# Patient Record
Sex: MALE | Race: NATIVE HAWAIIAN OR OTHER PACIFIC ISLANDER | HISPANIC OR LATINO | Employment: FULL TIME | URBAN - METROPOLITAN AREA
[De-identification: names, ages, dates, MRNs, and addresses within clinical notes are randomized per-mention and may not be internally consistent; named-entity substitution may affect disease eponyms.]

---

## 2024-09-15 ENCOUNTER — HOSPITAL ENCOUNTER (EMERGENCY)
Facility: HOSPITAL | Age: 34
Discharge: HOME | End: 2024-09-15

## 2024-09-15 ENCOUNTER — APPOINTMENT (OUTPATIENT)
Dept: ULTRASOUND IMAGING | Facility: HOSPITAL | Age: 34
End: 2024-09-15

## 2024-09-15 VITALS
TEMPERATURE: 98.5 F | OXYGEN SATURATION: 95 % | BODY MASS INDEX: 31.1 KG/M2 | HEIGHT: 69 IN | SYSTOLIC BLOOD PRESSURE: 137 MMHG | DIASTOLIC BLOOD PRESSURE: 79 MMHG | WEIGHT: 210 LBS | HEART RATE: 71 BPM | RESPIRATION RATE: 20 BRPM

## 2024-09-15 DIAGNOSIS — M79.601 PAIN OF RIGHT UPPER EXTREMITY: ICD-10-CM

## 2024-09-15 DIAGNOSIS — G56.01 CARPAL TUNNEL SYNDROME OF RIGHT WRIST: ICD-10-CM

## 2024-09-15 PROCEDURE — 93971 EXTREMITY STUDY: CPT | Mod: RT

## 2024-09-15 PROCEDURE — 99284 EMERGENCY DEPT VISIT MOD MDM: CPT | Mod: 25

## 2024-09-15 PROCEDURE — 250N000013 HC RX MED GY IP 250 OP 250 PS 637

## 2024-09-15 RX ORDER — IBUPROFEN 600 MG/1
600 TABLET, FILM COATED ORAL ONCE
Status: COMPLETED | OUTPATIENT
Start: 2024-09-15 | End: 2024-09-15

## 2024-09-15 RX ORDER — ACETAMINOPHEN 325 MG/1
650 TABLET ORAL ONCE
Status: COMPLETED | OUTPATIENT
Start: 2024-09-15 | End: 2024-09-15

## 2024-09-15 RX ADMIN — ACETAMINOPHEN 650 MG: 325 TABLET ORAL at 13:40

## 2024-09-15 RX ADMIN — IBUPROFEN 600 MG: 600 TABLET, FILM COATED ORAL at 13:40

## 2024-09-15 ASSESSMENT — COLUMBIA-SUICIDE SEVERITY RATING SCALE - C-SSRS
2. HAVE YOU ACTUALLY HAD ANY THOUGHTS OF KILLING YOURSELF IN THE PAST MONTH?: NO
1. IN THE PAST MONTH, HAVE YOU WISHED YOU WERE DEAD OR WISHED YOU COULD GO TO SLEEP AND NOT WAKE UP?: NO
6. HAVE YOU EVER DONE ANYTHING, STARTED TO DO ANYTHING, OR PREPARED TO DO ANYTHING TO END YOUR LIFE?: NO

## 2024-09-15 ASSESSMENT — ACTIVITIES OF DAILY LIVING (ADL): ADLS_ACUITY_SCORE: 33

## 2024-09-15 NOTE — ED TRIAGE NOTES
Patient here with right arm pain for one week, reports no recent injury. Pain is primary from elbow to wrist, feeling tension up to neck. States it is difficult to close hand, and describes it feels like tendonitis or carpal tunnel. No history of these. Pain is making it difficult to sleep.

## 2024-09-15 NOTE — DISCHARGE INSTRUCTIONS
You were seen in the emergency department for evaluation of right arm pain.  Thankfully, there is no blood clot in the arm.  It seems that you are having a more muscle pain of the upper arm and forearm.  Use Tylenol and ibuprofen for this pain.  You can also alternate ice and heat to help with the muscle soreness.    You may take 600 mg of ibuprofen (Advil) every 6 hours and 650 mg acetaminophen (Tylenol) every 6 hours for pain. Do not take more than 3200 mg of ibuprofen (Advil) in 24 hours. Do not take more than 3000 mg of acetaminophen (Tylenol) in 24 hours. You may take this much for 1-3 days, then only use as needed.     The tingling in your fingers is most consistent with carpal tunnel.  I provided you with a wrist brace to wear at work and while you are sleeping.    I referred you to primary care.  Please call them tomorrow to set up an appointment to discuss the pain that you have been having.    Return to the emergency department for fever, uncontrollable pain, redness/swelling of the hand/arm, or any other concerning symptoms.

## 2024-09-15 NOTE — ED PROVIDER NOTES
Emergency Department Encounter   NAME: Lasha Crawford  AGE: 34 year old male  YOB: 1990  MRN: [id number]    PCP: No primary care provider on file.  ED PROVIDER: Luna Julio PA-C    Evaluation Date & Time:   No admission date for patient encounter.    CHIEF COMPLAINT:  Arm Pain      Impression and Plan   MDM: 34-year-old male with no pertinent history presents for evaluation of arm pain.  Intermittent right thumb, index, and middle finger tingling.  Worse when he is working with his right hand, when he is trying to go to bed, and right away in the morning.  On arrival here patient is vitally stable.  Afebrile.  On my examination patient is in no acute distress.  He has no bony tenderness of the arm.  He has full range of motion of all joints on the arm.  He has good strength and sensation which is equal to the left side.  He has no neck tenderness or pain with neck range of motion.  Neck range of motion does not reproduce his pain.     No bony tenderness or trauma concerning for fracture -I do not think that any x-rays are indicated at this time.  Symptoms not reproducible with neck range of motion and he has no neck pain, I have lower suspicion for cervical radiculopathy.  He has muscular tenderness on the right upper extremity in the trapezius, bicep, and brachial radialis.  This is consistent with overuse/muscular soreness given his occupation and repetitive movements. He has no weakness or deficit of the upper extremity and given the distribution in the thumb, index, and middle fingers I have low suspicion that this is related to CVA.  Arm is neurovascularly intact and not consistent with arterial occlusion.  He says he has noticed some swelling in the right hand -I noticed may be just a trace amount of swelling.  Out of an abundance of caution we will do an ultrasound to ensure there is no clot.  He has positive Tinel's and Phalen's and his history is most consistent with carpal tunnel  syndrome.  We discussed plan for Tylenol, ibuprofen, and ultrasound which patient is agreeable to.    Ultrasound without evidence of DVT.  I reassessed the patient.  He is feeling better after Tylenol and ibuprofen.  We discussed that his history and exam is most consistent with carpal tunnel syndrome.  I provided him with a wrist brace.  I referred him to primary care as he does not have an established provider. He will call them in the morning to schedule follow-up appointment.  We reviewed ice, elevation, Tylenol, ibuprofen for pain.  He will wear the wrist brace while he is at work and when he is sleeping.  We reviewed strict return precautions and patient was discharged home in stable condition.         12:10 PM I met with the patient, obtained history, performed an initial exam, and discussed options and plan for diagnostics and treatment here in the ED.      Medical Decision Making  Obtained supplemental history:Supplemental history obtained?: No  Reviewed external records: External records reviewed?: Documented in chart  Care impacted by chronic illness:N/A  Care significantly affected by social determinants of health:N/A  Did you consider but not order tests?: Work up considered but not performed and documented in chart, if applicable  Did you interpret images independently?: Independent interpretation of ECG and images noted in documentation, when applicable.  Consultation discussion with other provider:Did you involve another provider (consultant, MH, pharmacy, etc.)?: No  Discharge. No recommendations on prescription strength medication(s). N/A.   At the conclusion of the encounter I discussed the results of all the tests and the disposition. The questions were answered. The patient or family acknowledged understanding and was agreeable with the care plan.    Not Applicable       FINAL IMPRESSION:    ICD-10-CM    1. Pain of right upper extremity  M79.601 Wrist/Arm/Hand Bracing Supplies Order Wrist Brace;  Right; non-thumb spica     Primary Care Referral      2. Carpal tunnel syndrome of right wrist  G56.01             MEDICATIONS GIVEN IN THE EMERGENCY DEPARTMENT:  Medications   acetaminophen (TYLENOL) tablet 650 mg (650 mg Oral $Given 9/15/24 1340)   ibuprofen (ADVIL/MOTRIN) tablet 600 mg (600 mg Oral $Given 9/15/24 1340)         NEW PRESCRIPTIONS STARTED AT TODAY'S ED VISIT:  New Prescriptions    No medications on file         HPI   Patient information was obtained from: patient    Use of Intrepreter: Yes - Phone - Japanese    Lasha Crawford is a 34 year old male with no documented pertinent history who presents to the ED by walking for evaluation of arm pain.    Patient stated that he has been having one week of right-sided pain in his trap, bicep, forearm, along with intermittent tingling in his 1st, 2nd, and 3rd fingers. He described the pain in his arm as a pressure. He described the tingling sensation in his fingers as feeling like he has no circulation in his hand. His symptoms are worse at night before he goes to bed and right away in the morning.  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  He notes that his symptoms are worse at work as well.  Patient has tried to alleviate his symptoms by using ibuprofen, naproxen, and sleeping in different positions. Patient said that he does do a fair amount of heavy lifting at work. Patient denied any history of blood clots, recent trauma, recent fall, and did not mention any other symptoms.       REVIEW OF SYSTEMS:  Pertinent positive and negative symptoms per HPI.       Medical History     No past medical history on file.    No past surgical history on file.    No family history on file.         No current outpatient medications on file.        Physical Exam     First Vitals:  Patient Vitals for the past 24 hrs:   BP Temp Temp src Pulse Resp SpO2 Height Weight   09/15/24 1141 -- -- -- -- -- -- 1.75 m  "(5' 8.9\") 95.3 kg (210 lb)   09/15/24 1134 137/79 98.5  F (36.9  C) Oral 71 20 95 % -- --       PHYSICAL EXAM:   General Appearance:  Alert, cooperative, no distress, appears stated age  HENT: Normocephalic without obvious deformity, atraumatic. Mucous membranes moist   Eyes: Conjunctiva clear, Lids normal. No discharge.   Respiratory: No distress. Lungs clear to ausculation bilaterally. No wheezes, rhonchi or stridor  Cardiovascular: Regular rate and rhythm, no murmur. Normal cap refill.  Trace swelling of the right hand.  Musculoskeletal: Moving all extremities. No gross deformities  Right upper extremity: Full range of motion of shoulder, elbow, wrist, hand.  5/5 strength with shoulder abduction/adduction, elbow flexion/extension, wrist flexion/extension, finger abduction/adduction, thumb opposition.  Sensation is intact to light touch.  He has no tenderness to palpation of the fingers, metacarpals, carpal bones, radius, ulna, olecranon, humerus, acromion, clavicle.  He has mild tenderness to palpation notable for the trapezius, bicep, and brachial radialis.  Positive Tinel's and Phalen's.  Left upper extremity: Full range of motion of shoulder, elbow, wrist, hand.  5/5 strength with shoulder abduction/adduction, elbow flexion/extension, wrist flexion/extension, finger abduction/adduction, thumb opposition.   Integument: Warm, dry, no rashes or lesions  Neurologic: Alert and orientated x3.   Psych: Normal mood and affect      Results     LAB:  All pertinent labs reviewed and interpreted  Labs Ordered and Resulted from Time of ED Arrival to Time of ED Departure - No data to display    RADIOLOGY:  US Upper Extremity Venous Duplex Right   Final Result   IMPRESSION:   1.  No deep venous thrombosis in the right upper extremity.          I, Dayton Mobley, am serving as a scribe to document services personally performed by Luna Julio PA-C, based on my observation and the provider's statements to me. I, Luna Julio PA-C " attest that Dayton Mobley is acting in a scribe capacity, has observed my performance of the services and has documented them in accordance with my direction.       Luna Julio PA-C   Emergency Medicine   [facility] EMERGENCY DEPARTMENT       Luna Julio PA-C  09/15/24 [id number]       Luna Julio PA-C  09/15/24 0590

## 2024-09-15 NOTE — Clinical Note
Lasha Crawford was seen and treated in our emergency department on 9/15/2024.  He may return to work on 09/17/2024.       If you have any questions or concerns, please don't hesitate to call.      Luna Julio PA-C

## 2024-09-18 ENCOUNTER — APPOINTMENT (OUTPATIENT)
Dept: ADMINISTRATIVE | Facility: CLINIC | Age: 34
End: 2024-09-18

## 2025-04-22 ENCOUNTER — LAB REQUISITION (OUTPATIENT)
Dept: LAB | Facility: CLINIC | Age: 35
End: 2025-04-22
Payer: COMMERCIAL

## 2025-07-09 ENCOUNTER — LAB REQUISITION (OUTPATIENT)
Dept: LAB | Facility: CLINIC | Age: 35
End: 2025-07-09
Payer: COMMERCIAL

## 2025-07-09 DIAGNOSIS — R42 DIZZINESS AND GIDDINESS: ICD-10-CM

## 2025-07-09 LAB
ALBUMIN SERPL BCG-MCNC: 4.6 G/DL (ref 3.5–5.2)
ALP SERPL-CCNC: 86 U/L (ref 40–150)
ALT SERPL W P-5'-P-CCNC: 64 U/L (ref 0–70)
ANION GAP SERPL CALCULATED.3IONS-SCNC: 15 MMOL/L (ref 7–15)
AST SERPL W P-5'-P-CCNC: 30 U/L (ref 0–45)
BASOPHILS # BLD AUTO: 0 10E3/UL (ref 0–0.2)
BASOPHILS NFR BLD AUTO: 1 %
BILIRUB SERPL-MCNC: 0.5 MG/DL
BUN SERPL-MCNC: 12.1 MG/DL (ref 6–20)
CALCIUM SERPL-MCNC: 9.7 MG/DL (ref 8.8–10.4)
CHLORIDE SERPL-SCNC: 104 MMOL/L (ref 98–107)
CREAT SERPL-MCNC: 0.88 MG/DL (ref 0.67–1.17)
EGFRCR SERPLBLD CKD-EPI 2021: >90 ML/MIN/1.73M2
EOSINOPHIL # BLD AUTO: 0.1 10E3/UL (ref 0–0.7)
EOSINOPHIL NFR BLD AUTO: 1 %
ERYTHROCYTE [DISTWIDTH] IN BLOOD BY AUTOMATED COUNT: 13.6 % (ref 10–15)
GLUCOSE SERPL-MCNC: 85 MG/DL (ref 70–99)
HCO3 SERPL-SCNC: 22 MMOL/L (ref 22–29)
HCT VFR BLD AUTO: 46.4 % (ref 40–53)
HGB BLD-MCNC: 16 G/DL (ref 13.3–17.7)
IMM GRANULOCYTES # BLD: 0.1 10E3/UL
IMM GRANULOCYTES NFR BLD: 1 %
LYMPHOCYTES # BLD AUTO: 2.2 10E3/UL (ref 0.8–5.3)
LYMPHOCYTES NFR BLD AUTO: 38 %
MCH RBC QN AUTO: 29.5 PG (ref 26.5–33)
MCHC RBC AUTO-ENTMCNC: 34.5 G/DL (ref 31.5–36.5)
MCV RBC AUTO: 86 FL (ref 78–100)
MONOCYTES # BLD AUTO: 0.4 10E3/UL (ref 0–1.3)
MONOCYTES NFR BLD AUTO: 7 %
NEUTROPHILS # BLD AUTO: 3.1 10E3/UL (ref 1.6–8.3)
NEUTROPHILS NFR BLD AUTO: 53 %
NRBC # BLD AUTO: 0 10E3/UL
NRBC BLD AUTO-RTO: 0 /100
PLATELET # BLD AUTO: 278 10E3/UL (ref 150–450)
POTASSIUM SERPL-SCNC: 3.7 MMOL/L (ref 3.4–5.3)
PROT SERPL-MCNC: 7.8 G/DL (ref 6.4–8.3)
RBC # BLD AUTO: 5.42 10E6/UL (ref 4.4–5.9)
SODIUM SERPL-SCNC: 141 MMOL/L (ref 135–145)
TSH SERPL DL<=0.005 MIU/L-ACNC: 1.3 UIU/ML (ref 0.3–4.2)
WBC # BLD AUTO: 5.9 10E3/UL (ref 4–11)

## 2025-07-09 PROCEDURE — 85025 COMPLETE CBC W/AUTO DIFF WBC: CPT | Mod: ORL | Performed by: FAMILY MEDICINE

## 2025-07-09 PROCEDURE — 84443 ASSAY THYROID STIM HORMONE: CPT | Mod: ORL | Performed by: FAMILY MEDICINE

## 2025-07-09 PROCEDURE — 80053 COMPREHEN METABOLIC PANEL: CPT | Mod: ORL | Performed by: FAMILY MEDICINE
